# Patient Record
Sex: MALE | Race: WHITE | URBAN - METROPOLITAN AREA
[De-identification: names, ages, dates, MRNs, and addresses within clinical notes are randomized per-mention and may not be internally consistent; named-entity substitution may affect disease eponyms.]

---

## 2021-02-22 ENCOUNTER — OFFICE VISIT (OUTPATIENT)
Dept: INTERNAL MEDICINE CLINIC | Facility: CLINIC | Age: 61
End: 2021-02-22
Payer: COMMERCIAL

## 2021-02-22 VITALS
HEIGHT: 70 IN | WEIGHT: 216 LBS | OXYGEN SATURATION: 97 % | TEMPERATURE: 98 F | DIASTOLIC BLOOD PRESSURE: 85 MMHG | HEART RATE: 59 BPM | SYSTOLIC BLOOD PRESSURE: 150 MMHG | BODY MASS INDEX: 30.92 KG/M2

## 2021-02-22 DIAGNOSIS — Z01.818 PREOPERATIVE EVALUATION TO RULE OUT SURGICAL CONTRAINDICATION: Primary | ICD-10-CM

## 2021-02-22 DIAGNOSIS — Z12.11 SCREENING FOR MALIGNANT NEOPLASM OF COLON: ICD-10-CM

## 2021-02-22 DIAGNOSIS — H26.9 CATARACT, UNSPECIFIED CATARACT TYPE, UNSPECIFIED LATERALITY: ICD-10-CM

## 2021-02-22 DIAGNOSIS — Z11.59 NEED FOR HEPATITIS C SCREENING TEST: ICD-10-CM

## 2021-02-22 PROCEDURE — 3008F BODY MASS INDEX DOCD: CPT | Performed by: INTERNAL MEDICINE

## 2021-02-22 PROCEDURE — 3725F SCREEN DEPRESSION PERFORMED: CPT | Performed by: INTERNAL MEDICINE

## 2021-02-22 PROCEDURE — 99242 OFF/OP CONSLTJ NEW/EST SF 20: CPT | Performed by: INTERNAL MEDICINE

## 2021-02-22 RX ORDER — BROMFENAC SODIUM 0.7 MG/ML
SOLUTION/ DROPS OPHTHALMIC
COMMUNITY
Start: 2021-02-22

## 2021-02-22 RX ORDER — BESIFLOXACIN 6 MG/ML
SUSPENSION OPHTHALMIC
COMMUNITY
Start: 2021-02-22

## 2021-02-22 NOTE — PROGRESS NOTES
Assessment/Plan:    No problem-specific Assessment & Plan notes found for this encounter  Diagnoses and all orders for this visit:    Preoperative evaluation to rule out surgical contraindication    Other orders  -     Besivance 0 6 % SUSP  -     Prolensa 0 07 % SOLN          Subjective:      Patient ID: Mare Lennox is a 61 y o  male  Patient  Here  To  Be  Evaluated  For   preop  Clearance  For  Cataract  Surgery  Patient  Is  A  Low risk  Patient   Based  On  The  Revised  Cardiac risk  Assessment , to  Undergo  A  Low  Risk  Surgery  OK  To  Proceed  With  Surgery  The following portions of the patient's history were reviewed and updated as appropriate: allergies, current medications, past family history, past medical history, past social history, past surgical history, and problem list     Review of Systems   Constitutional: Negative  HENT: Negative for dental problem, drooling, ear discharge and ear pain  Eyes: Negative for discharge, redness and itching  Respiratory: Negative for apnea, cough and wheezing  Cardiovascular: Negative for chest pain and palpitations  Gastrointestinal: Negative for abdominal pain, blood in stool, diarrhea and vomiting  Endocrine: Negative for polydipsia, polyphagia and polyuria  Genitourinary: Negative for decreased urine volume, dysuria and frequency  Musculoskeletal: Negative for arthralgias, myalgias and neck stiffness  Skin: Negative for pallor and wound  Allergic/Immunologic: Negative for environmental allergies and food allergies  Neurological: Negative for facial asymmetry, light-headedness, numbness and headaches  Hematological: Negative for adenopathy  Does not bruise/bleed easily  Psychiatric/Behavioral: Negative for agitation, behavioral problems and confusion  Objective: There were no vitals taken for this visit           Physical Exam      /88 P 70    HEENT  Bilateral  Cataract    NECK  No  JVD  No Bruits    LUNGS  Clear  HEART   Regular  Heart  Rate  And  Rhythm    ABDOMEN  No  Organomegaly  Or  Masses    EXTREMITIES  NO   Edema    Patient  Has  Been  Cleared  For  Cataract  Surgery   Very  Low  Risk  Patient   Based  On the  Revised  Cardiac  Risk  Assessment   Score  Natasha Console   IOANA Story MD

## 2021-04-25 ENCOUNTER — EMERGENCY (EMERGENCY)
Facility: HOSPITAL | Age: 61
LOS: 1 days | Discharge: HOME | End: 2021-04-25
Admitting: EMERGENCY MEDICINE
Payer: COMMERCIAL

## 2021-04-25 VITALS
WEIGHT: 214.95 LBS | HEART RATE: 60 BPM | SYSTOLIC BLOOD PRESSURE: 162 MMHG | DIASTOLIC BLOOD PRESSURE: 85 MMHG | TEMPERATURE: 98 F | RESPIRATION RATE: 16 BRPM | HEIGHT: 70 IN | OXYGEN SATURATION: 100 %

## 2021-04-25 DIAGNOSIS — H57.89 OTHER SPECIFIED DISORDERS OF EYE AND ADNEXA: ICD-10-CM

## 2021-04-25 DIAGNOSIS — Z98.49 CATARACT EXTRACTION STATUS, UNSPECIFIED EYE: Chronic | ICD-10-CM

## 2021-04-25 DIAGNOSIS — H04.121 DRY EYE SYNDROME OF RIGHT LACRIMAL GLAND: ICD-10-CM

## 2021-04-25 PROCEDURE — 99282 EMERGENCY DEPT VISIT SF MDM: CPT

## 2021-04-25 PROCEDURE — 99283 EMERGENCY DEPT VISIT LOW MDM: CPT

## 2021-04-25 NOTE — ED PROVIDER NOTE - NSFOLLOWUPINSTRUCTIONS_ED_ALL_ED_FT
please use artificial tears - systane preservative free (1-2 drops once an hour) over next 24 hrs  you must see your eye doctor tomorrow for full dilated exam  return immediately for any worsening or concerning symptoms

## 2021-04-25 NOTE — ED PROVIDER NOTE - PATIENT PORTAL LINK FT
You can access the FollowMyHealth Patient Portal offered by [facility] by registering at the following website: [email or web address]. By joining [facility]’s FollowMyHealth portal, you will also be able to view your health information using other applications (apps) compatible with our system.

## 2021-04-25 NOTE — ED PROVIDER NOTE - CLINICAL SUMMARY MEDICAL DECISION MAKING FREE TEXT BOX
pt had cataract surg a mon ago, states over past wk the r eye has been irritated, has not f/u w/his eye doctor, no acute or worsening symptoms today, on exam no injection no dc. well appearing, + dry conjunctiva b/l. no concern for acute glaucoma, no abrasion or ulcer. advised to use artificial tears and f/u w/his eye doctor alistair. pt understands and agrees w/plan, strict return precautions given

## 2021-04-25 NOTE — ED ADULT NURSE NOTE - OBJECTIVE STATEMENT
60Y Male A&OX3 C/O right eye erythema for a week. Pt reports having bilateral cataract surgery 2 months ago. Clear drainage noted to right eye. pr pt "It does not feel painful. More like a discomfort that moved down towards my nose. I have not had changes in vision but sometimes see a flash like a disco". Denies chest pain, fever, chills, nor sob right eye lid swelling noted. Pt unsure of daily eye drop medication name. 60Y Male A&OX3 C/O right eye erythema for a week. Pt reports having bilateral cataract surgery 2 months ago. Clear drainage noted to right eye. pr pt "It does not feel painful. More like a discomfort that moved down towards my nose. I have not had changes in vision but sometimes see a flash like a disco". Denies chest pain, fever, chills, nor sob. Right eye lid swelling noted. Pt unsure of daily eye drop medication name.

## 2021-04-25 NOTE — ED ADULT NURSE NOTE - NSIMPLEMENTINTERV_GEN_ALL_ED
Implemented All Universal Safety Interventions:  [place] to call system. Call bell, personal items and telephone within reach. Instruct patient to call for assistance. Room bathroom lighting operational. Non-slip footwear when patient is off stretcher. Physically safe environment: no spills, clutter or unnecessary equipment. Stretcher in lowest position, wheels locked, appropriate side rails in place.

## 2021-04-25 NOTE — ED ADULT NURSE NOTE - CHPI ED NUR SYMPTOMS NEG
no blurred vision/no double vision/no foreign body/no itching/no pain/no photophobia/no purulent drainage

## 2021-04-25 NOTE — ED ADULT TRIAGE NOTE - CHIEF COMPLAINT QUOTE
Pt had cataract surgery to right eye on 3/23, reports redness, tearing to right eye and headache, denies changes in vision.

## 2021-04-25 NOTE — ED ADULT NURSE NOTE - NSFALLRSKOUTCOME_ED_ALL_ED
Client participated in a one-hour recreation group in which all clients played a game of Sorry, then played ping-pong afterwards.   Universal Safety Interventions

## 2021-04-25 NOTE — ED PROVIDER NOTE - OBJECTIVE STATEMENT
The pt is a The pt is a 59 y/o M, who presents to ED c/o R eye irritation x 1 wk. Pt had cataract surg a month ago (done in [place]), states that last wk the eye was a little red, today it felt swollen, he spoke to his eye doctor and told to come in for eval, but since was in [place] today - decided to go to ED instead. Denies injury, trauma, discharge, pain, visual changes, h/a, dizziness, n/v.

## 2021-04-25 NOTE — ED PROVIDER NOTE - EYES, MLM
Clear bilaterally, pupils equal, round and reactive to light. + dry conjunctiva b/l. OD anesthetized w/2 gtts tetracaine, stained w/fluoro strip - no fb, no abrasion, no ulcer

## 2023-01-02 ENCOUNTER — APPOINTMENT (EMERGENCY)
Dept: RADIOLOGY | Facility: HOSPITAL | Age: 63
End: 2023-01-02

## 2023-01-02 ENCOUNTER — HOSPITAL ENCOUNTER (EMERGENCY)
Facility: HOSPITAL | Age: 63
Discharge: HOME | End: 2023-01-02
Attending: EMERGENCY MEDICINE

## 2023-01-02 VITALS
HEART RATE: 105 BPM | DIASTOLIC BLOOD PRESSURE: 87 MMHG | SYSTOLIC BLOOD PRESSURE: 140 MMHG | RESPIRATION RATE: 18 BRPM | OXYGEN SATURATION: 97 % | TEMPERATURE: 98.6 F

## 2023-01-02 DIAGNOSIS — H93.8X3 EAR SWELLING, BILATERAL: ICD-10-CM

## 2023-01-02 DIAGNOSIS — R51.9 HEADACHE: Primary | ICD-10-CM

## 2023-01-02 RX ORDER — METOCLOPRAMIDE 10 MG/1
10 TABLET ORAL ONCE
Status: COMPLETED | OUTPATIENT
Start: 2023-01-02 | End: 2023-01-02

## 2023-01-02 RX ORDER — KETOROLAC TROMETHAMINE 30 MG/ML
15 INJECTION, SOLUTION INTRAMUSCULAR; INTRAVENOUS ONCE
Status: COMPLETED | OUTPATIENT
Start: 2023-01-02 | End: 2023-01-02

## 2023-01-02 RX ORDER — ACETAMINOPHEN 325 MG/1
975 TABLET ORAL ONCE
Status: COMPLETED | OUTPATIENT
Start: 2023-01-02 | End: 2023-01-02

## 2023-01-02 RX ADMIN — KETOROLAC TROMETHAMINE 15 MG: 30 INJECTION, SOLUTION INTRAMUSCULAR; INTRAVENOUS at 02:42

## 2023-01-02 RX ADMIN — DEXAMETHASONE 10 MG: 2 TABLET ORAL at 04:42

## 2023-01-02 RX ADMIN — ACETAMINOPHEN 975 MG: 325 TABLET, FILM COATED ORAL at 02:42

## 2023-01-02 RX ADMIN — METOCLOPRAMIDE 10 MG: 10 TABLET ORAL at 02:42

## 2023-01-02 NOTE — DISCHARGE INSTRUCTIONS
You were seen for a headache and facial pain  We found no emergent causes for your pain  You were offered a lumbar puncture to rule out any bleeding inside your skull, but you declined  Please follow up with your primary care doctor for management of your symptoms

## 2023-01-02 NOTE — ED PROVIDER NOTES
History  Chief Complaint   Patient presents with   • Headache     Headache progressively worse since Thursday  Unable to fall asleep so he came in  States all around head and face is painful to touch  When he bends over it feels like his "head is going to explode " Took motrin Sat which didn't help  Negative at home COVID test  No hx migraines  77-year-old man with no relevant past med history presents with headache  He reports a progressively worsening headache since Thursday  He denies any sudden onset worst headache of his life  Headache continues to worsen and at this point he is having difficulty sleeping  The headache is diffuse and all around his head  He is reporting bilateral ear swelling but no tenderness  No fever, chills, vision changes, hearing changes, muscle weakness, sensory deficits, or difficulty speaking  He tried Advil with no improvement of the symptoms  He took nothing today for his headache  He denies history of headaches  Prior to Admission Medications   Prescriptions Last Dose Informant Patient Reported? Taking? Besivance 0 6 % SUSP   Yes No   Prolensa 0 07 % SOLN   Yes No      Facility-Administered Medications: None       Past Medical History:   Diagnosis Date   • Cervical disc disorder with radiculopathy, unspecified cervical region    • Dyslipidemia    • Vitamin D deficiency        Past Surgical History:   Procedure Laterality Date   • NO PAST SURGERIES         Family History   Problem Relation Age of Onset   • Coronary artery disease Mother         CABG   • Leukemia Father      I have reviewed and agree with the history as documented      E-Cigarette/Vaping     E-Cigarette/Vaping Substances     Social History     Tobacco Use   • Smoking status: Every Day     Packs/day: 1 00     Years: 45 00     Pack years: 45 00     Types: Cigarettes   • Smokeless tobacco: Never   • Tobacco comments:     smokes 11-20 cigarettes/day - As per [facility]    Substance Use Topics   • Alcohol use: Yes     Comment: Socially         Review of Systems   Constitutional: Negative for chills and fever  HENT: Negative for ear pain and sore throat  Eyes: Negative for pain and visual disturbance  Respiratory: Negative for cough and shortness of breath  Cardiovascular: Negative for chest pain and palpitations  Gastrointestinal: Negative for abdominal pain, constipation, diarrhea and vomiting  Genitourinary: Negative for dysuria and hematuria  Musculoskeletal: Negative for arthralgias and back pain  Skin: Negative for color change and rash  Neurological: Positive for headaches  Negative for seizures, syncope and light-headedness  Psychiatric/Behavioral: Negative for agitation and confusion  Physical Exam  ED Triage Vitals   Temperature Pulse Respirations Blood Pressure SpO2   01/02/23 0227 01/02/23 0222 01/02/23 0222 01/02/23 0222 01/02/23 0222   98 6 °F (37 °C) 105 18 140/87 97 %      Temp Source Heart Rate Source Patient Position - Orthostatic VS BP Location FiO2 (%)   01/02/23 0227 01/02/23 0222 01/02/23 0222 01/02/23 0222 --   Oral Monitor Sitting Right arm       Pain Score       01/02/23 0222       8             Orthostatic Vital Signs  Vitals:    01/02/23 0222   BP: 140/87   Pulse: 105   Patient Position - Orthostatic VS: Sitting       Physical Exam  Vitals and nursing note reviewed  Constitutional:       General: He is not in acute distress  Appearance: Normal appearance  He is not ill-appearing  HENT:      Head: Normocephalic and atraumatic  Right Ear: Tympanic membrane and ear canal normal       Left Ear: Tympanic membrane and ear canal normal       Ears:      Comments: Diffuse swelling of bilateral ears  Eyes:      Extraocular Movements: Extraocular movements intact  Pupils: Pupils are equal, round, and reactive to light  Comments: No photophobia  Pulmonary:      Effort: Pulmonary effort is normal  No respiratory distress     Musculoskeletal: General: Normal range of motion  Cervical back: Normal range of motion and neck supple  Skin:     General: Skin is warm and dry  Neurological:      General: No focal deficit present  Mental Status: He is alert and oriented to person, place, and time  Psychiatric:         Mood and Affect: Mood normal          Behavior: Behavior normal          ED Medications  Medications   ketorolac (TORADOL) injection 15 mg (15 mg Intramuscular Given 1/2/23 0242)   acetaminophen (TYLENOL) tablet 975 mg (975 mg Oral Given 1/2/23 0242)   metoclopramide (REGLAN) tablet 10 mg (10 mg Oral Given 1/2/23 0242)   dexamethasone (DECADRON) tablet 10 mg (10 mg Oral Given 1/2/23 0442)       Diagnostic Studies  Results Reviewed     None                 CT head without contrast   ED Interpretation by Makenna Toney MD (01/02 0401)   On my interpretation:  -No obvious intracranial hemorrhage, no obvious ear abnormalities    Awaiting formal read from the radiologist      Final Result by Dudley Simons DO (01/02 0359)      No acute intracranial abnormality  Workstation performed: [id number]               Procedures  Procedures      ED Course         Medical Decision Making  Presents with three days of progressively worsening headache and now with bilateral ear swelling  Patient denies any thunderclap headache but does endorse he does not have headaches and this is new for him  He is now reporting some neck pain with the headache  Will give headache cocktail  CT head ordered to rule out any masses or bleeding, and it was negative  I had a lengthy discussion with the patient about possibly performing an LP to rule out SAH  I told the patient there is a very low chance of him having intracranial bleeding as he lacks some of the more concerning symptoms, but I cannot say there is no intracranial bleeding  I am concerned as he endorsed this headache was very distressing for him, and he does not have headaches  After thorough discussion of risks and benefits, he declined the LP  He reports his headache was improved with the headache cocktail  Upon further discussion, he reports his headache is more of palpable head pain rather than pulsatile headache  Unsure the cause of his head pain and ear swelling but possibly a viral syndrome or allergic reaction  Patient in agreement with plan and questions were answered  Verbalized understanding of return precautions  Portions or all of this note were generated using voice recognition software  Occasional wrong word or "sound a like" substitutions may have occurred due to the inherent limitations of voice recognition software  Please interpret any errors within the intended context of the whole sentence or idea  Ear swelling, bilateral: acute illness or injury  Headache: acute illness or injury        Disposition  Final diagnoses:   Headache   Ear swelling, bilateral     Time reflects when diagnosis was documented in both MDM as applicable and the Disposition within this note     Time User Action Codes Description Comment    1/2/2023  5:10 AM Delle Dionte Add [R51 9] Headache     1/2/2023  5:10 AM Delle Dionte Add [D87 5G1] Ear swelling, bilateral       ED Disposition     ED Disposition   Discharge    Condition   Stable    Date/Time   Mon Jan 2, 2023  5:10 AM    Comment   Bienvenido James discharge to home/self care  Follow-up Information     Follow up With Specialties Details Why Contact Tri Post MD Internal Medicine Go to   [address] [place] [address]  [phone]            Discharge Medication List as of 1/2/2023  5:11 AM      CONTINUE these medications which have NOT CHANGED    Details   Besivance 0 6 % SUSP Starting Mon 2/22/2021, Historical Med      Prolensa 0 07 % SOLN Starting Mon 2/22/2021, Historical Med           No discharge procedures on file      PDMP Review     None           ED Provider  Attending physically available and evaluated Arya Paulino I managed the patient along with the ED Attending      Electronically Signed by         Carolynn Camarena MD  01/02/23 4758

## 2023-01-02 NOTE — ED ATTENDING ATTESTATION
Final Diagnosis:  1  Headache    2  Ear swelling, bilateral      ED Course as of 01/02/23 0523   Mon Jan 02, 2023   0251 HA since Thursday  Getting worse  0522 Based off of the reassuring CT scan and the possibility that subarachnoid hemorrhage could be a possibility for the headache (although my suspicion is extremely low), offered to do lumbar puncture  Patient does not want to done stating that he will follow-up with his primary care doctor  His headache is significantly improved and essentially nonexistent now I keep saying is a pain on the outside of his head  We will do return to ER precautions with supportive care     I, Summer Joaquin MD, saw and evaluated the patient  All available labs and X-rays were ordered by me or the resident and have been reviewed by myself  I discussed the patient with the resident / non-physician and agree with the resident's / non-physician practitioner's findings and plan as documented in the resident's / non-physician practicitioner's note, except where noted  At this point, I agree with the current assessment done in the ED  I was present during key portions of all procedures performed unless otherwise stated  Chief Complaint   Patient presents with   • Headache     Headache progressively worse since Thursday  Unable to fall asleep so he came in  States all around head and face is painful to touch  When he bends over it feels like his "head is going to explode " Took motrin Sat which didn't help  Negative at home COVID test  No hx migraines  This is a 58 y o  male presenting for evaluation of progressively worsening headache  Since Thursday or so he has been having this headache that is worse when he is bending his head forward, better if he is laying back, gradually getting worse much worse today than it was on Thursday  It was not sudden in onset, just getting worse  No fevers chills nausea vomiting chest pain shortness of breath    He thought could be related to COVID so took a COVID test at home which was negative  He tried Motrin times once on Saturday without any relief of his symptoms  It feels like his head is going to explode at times  No focal deficits including numbness or tingling on the arms or legs  Able to walk and do his normal activities  No direct trauma to the head  No visual disturbances including loss of vision double vision blurry vision  No trouble speaking or expressing himself  No hearing changes  He has noticed his bilateral ears are much more swollen and tender for him  On exam extraocular movements are intact, tympanic membranes bilaterally normal not erythematous  Otic canals are normal   The ear itself though bilaterally are red, swollen, almost looks like cauliflower ear but he had no reported trauma to it  There is no mastoid tenderness  There is no C-spine tenderness  There is no submandibular lymphadenopathy  Normal voice, no dysphagia or dysarthria  Pupillary response normal, 4 mm and reactive  No epistaxis noted  No forehead tenderness  He points to the left side of his head where it hurts from the frontal region to the temporal region   5-5, able to sit up from a laying position without any assistance  No belly tenderness  No chest wall tenderness  No tachycardia  Afebrile  Normotensive  Assessment plan: I think this is likely primary headache syndrome however the patient says that he is never really had headaches in the past so discussed doing a CT head  Is been there for several days however my suspicion that this represents a subarachnoid hemorrhage is very very small  I think the harms of a lumbar puncture far outweigh the benefits  With the bilateral ear swelling, I want a make sure there is no obvious abscess or complications there so I think CT head should be of the pickup at least a small portion of it  We will try symptomatic measures    If he feels better will discharge home else we will talk about further work-up specifically CBC BMP for leukocytosis or marked kidney abnormalities  Patient is okay with this plan has no questions time  PMH:   has a past medical history of Cervical disc disorder with radiculopathy, unspecified cervical region, Dyslipidemia, and Vitamin D deficiency  PSH:   has a past surgical history that includes No past surgeries  Social:  Social History     Substance and Sexual Activity   Alcohol Use Yes    Comment: Socially      Social History     Tobacco Use   Smoking Status Every Day   • Packs/day: 1 00   • Years: 45 00   • Pack years: 45 00   • Types: Cigarettes   Smokeless Tobacco Never   Tobacco Comments    smokes 11-20 cigarettes/day - As per [employer]      Social History     Substance and Sexual Activity   Drug Use Not on file     PE:  Vitals:    01/02/23 0222 01/02/23 0227   BP: 140/87    BP Location: Right arm    Pulse: 105    Resp: 18    Temp:  98 6 °F (37 °C)   TempSrc:  Oral   SpO2: 97%      - 13 point ROS was performed and all are normal unless stated in the history above  - Nursing note reviewed  Vitals reviewed  - Orders placed by myself and/or advanced practitioner / resident     - Previous chart was reviewed  - No language barrier    - History obtained from patient  - There are no limitations to the history obtained  - Critical care time: Not applicable for this patient       Code Status: No Order  Advance Directive and Living Will:      Power of :    POLST:      Medications   ketorolac (TORADOL) injection 15 mg (15 mg Intramuscular Given 1/2/23 0242)   acetaminophen (TYLENOL) tablet 975 mg (975 mg Oral Given 1/2/23 0242)   metoclopramide (REGLAN) tablet 10 mg (10 mg Oral Given 1/2/23 0242)   dexamethasone (DECADRON) tablet 10 mg (10 mg Oral Given 1/2/23 0442)     CT head without contrast   ED Interpretation   On my interpretation:  -No obvious intracranial hemorrhage, no obvious ear abnormalities    Awaiting formal read from the radiologist      Final Result      No acute intracranial abnormality  Workstation performed: [id number]           Orders Placed This Encounter   Procedures   • CT head without contrast     Labs Reviewed - No data to display  Time reflects when diagnosis was documented in both MDM as applicable and the Disposition within this note     Time User Action Codes Description Comment    1/2/2023  5:10 AM Leanderkody Atwoodo Add [R51 9] Headache     1/2/2023  5:10 AM Leander Atwoodo Add [B94 8A3] Ear swelling, bilateral       ED Disposition     ED Disposition   Discharge    Condition   Stable    Date/Time   Mon Jan 2, 2023  5:10 AM    Comment   Arya Jefferson discharge to home/self care  Follow-up Information     Follow up With Specialties Details Why Contact Efren Sanchez MD Internal Medicine Go to   [address] [place] [address]  [phone]          Patient's Medications   Discharge Prescriptions    No medications on file     No discharge procedures on file  Prior to Admission Medications   Prescriptions Last Dose Informant Patient Reported? Taking? Besivance 0 6 % SUSP   Yes No   Prolensa 0 07 % SOLN   Yes No      Facility-Administered Medications: None       Portions of the record may have been created with voice recognition software  Occasional wrong word or "sound a like" substitutions may have occurred due to the inherent limitations of voice recognition software  Read the chart carefully and recognize, using context, where substitutions have occurred      Electronically signed by:  Nora Rajan

## 2023-01-03 ENCOUNTER — OFFICE VISIT (OUTPATIENT)
Dept: INTERNAL MEDICINE CLINIC | Facility: CLINIC | Age: 63
End: 2023-01-03

## 2023-01-03 VITALS
WEIGHT: 207 LBS | HEART RATE: 70 BPM | TEMPERATURE: 98.2 F | HEIGHT: 70 IN | SYSTOLIC BLOOD PRESSURE: 132 MMHG | OXYGEN SATURATION: 97 % | BODY MASS INDEX: 29.63 KG/M2 | DIASTOLIC BLOOD PRESSURE: 70 MMHG

## 2023-01-03 DIAGNOSIS — N52.8 OTHER MALE ERECTILE DYSFUNCTION: ICD-10-CM

## 2023-01-03 DIAGNOSIS — Z00.00 ANNUAL PHYSICAL EXAM: Primary | ICD-10-CM

## 2023-01-03 DIAGNOSIS — R73.01 IMPAIRED FASTING BLOOD SUGAR: ICD-10-CM

## 2023-01-03 DIAGNOSIS — E78.2 MIXED HYPERLIPIDEMIA: ICD-10-CM

## 2023-01-03 DIAGNOSIS — R35.0 URINARY FREQUENCY: ICD-10-CM

## 2023-01-03 DIAGNOSIS — R53.83 OTHER FATIGUE: ICD-10-CM

## 2023-01-03 NOTE — PATIENT INSTRUCTIONS

## 2023-01-31 LAB
ALBUMIN SERPL-MCNC: 4.4 G/DL (ref 3.6–5.1)
ALBUMIN/GLOB SERPL: 2.2 (CALC) (ref 1–2.5)
ALP SERPL-CCNC: 55 U/L (ref 35–144)
ALT SERPL-CCNC: 28 U/L (ref 9–46)
AST SERPL-CCNC: 15 U/L (ref 10–35)
BASOPHILS # BLD AUTO: 57 CELLS/UL (ref 0–200)
BASOPHILS NFR BLD AUTO: 0.7 %
BILIRUB SERPL-MCNC: 0.6 MG/DL (ref 0.2–1.2)
BUN SERPL-MCNC: 13 MG/DL (ref 7–25)
BUN/CREAT SERPL: ABNORMAL (CALC) (ref 6–22)
CALCIUM SERPL-MCNC: 9.4 MG/DL (ref 8.6–10.3)
CHLORIDE SERPL-SCNC: 103 MMOL/L (ref 98–110)
CHOLEST SERPL-MCNC: 205 MG/DL
CHOLEST/HDLC SERPL: 3.7 (CALC)
CO2 SERPL-SCNC: 31 MMOL/L (ref 20–32)
CREAT SERPL-MCNC: 0.97 MG/DL (ref 0.7–1.35)
EOSINOPHIL # BLD AUTO: 221 CELLS/UL (ref 15–500)
EOSINOPHIL NFR BLD AUTO: 2.7 %
ERYTHROCYTE [DISTWIDTH] IN BLOOD BY AUTOMATED COUNT: 12.7 % (ref 11–15)
GFR/BSA.PRED SERPLBLD CYS-BASED-ARV: 88 ML/MIN/1.73M2
GLOBULIN SER CALC-MCNC: 2 G/DL (CALC) (ref 1.9–3.7)
GLUCOSE SERPL-MCNC: 115 MG/DL (ref 65–99)
HBA1C MFR BLD: 5.4 % OF TOTAL HGB
HCT VFR BLD AUTO: 43 % (ref 38.5–50)
HDLC SERPL-MCNC: 55 MG/DL
HGB BLD-MCNC: 14.6 G/DL (ref 13.2–17.1)
LDLC SERPL CALC-MCNC: 127 MG/DL (CALC)
LYMPHOCYTES # BLD AUTO: 2854 CELLS/UL (ref 850–3900)
LYMPHOCYTES NFR BLD AUTO: 34.8 %
MCH RBC QN AUTO: 32 PG (ref 27–33)
MCHC RBC AUTO-ENTMCNC: 34 G/DL (ref 32–36)
MCV RBC AUTO: 94.3 FL (ref 80–100)
MONOCYTES # BLD AUTO: 558 CELLS/UL (ref 200–950)
MONOCYTES NFR BLD AUTO: 6.8 %
NEUTROPHILS # BLD AUTO: 4510 CELLS/UL (ref 1500–7800)
NEUTROPHILS NFR BLD AUTO: 55 %
NONHDLC SERPL-MCNC: 150 MG/DL (CALC)
PLATELET # BLD AUTO: 255 THOUSAND/UL (ref 140–400)
PMV BLD REES-ECKER: 10.4 FL (ref 7.5–12.5)
POTASSIUM SERPL-SCNC: 4.6 MMOL/L (ref 3.5–5.3)
PROT SERPL-MCNC: 6.4 G/DL (ref 6.1–8.1)
PSA FREE MFR SERPL: 33 % (CALC)
PSA FREE SERPL-MCNC: 0.5 NG/ML
PSA SERPL-MCNC: 1.5 NG/ML
RBC # BLD AUTO: 4.56 MILLION/UL (ref 4.2–5.8)
SODIUM SERPL-SCNC: 138 MMOL/L (ref 135–146)
TRIGL SERPL-MCNC: 115 MG/DL
TSH SERPL-ACNC: 1.2 MIU/L (ref 0.4–4.5)
WBC # BLD AUTO: 8.2 THOUSAND/UL (ref 3.8–10.8)

## 2023-03-30 ENCOUNTER — TELEPHONE (OUTPATIENT)
Dept: INTERNAL MEDICINE CLINIC | Facility: CLINIC | Age: 63
End: 2023-03-30

## 2023-03-30 DIAGNOSIS — B49 FUNGAL INFECTION: Primary | ICD-10-CM

## 2023-03-30 RX ORDER — CLOTRIMAZOLE AND BETAMETHASONE DIPROPIONATE 10; .64 MG/G; MG/G
CREAM TOPICAL 2 TIMES DAILY
Qty: 30 G | Refills: 0 | Status: SHIPPED | OUTPATIENT
Start: 2023-03-30

## 2023-03-30 NOTE — TELEPHONE ENCOUNTER
Patient has jock itch and has tried everything over the counter and nothing is working  Can you fax something to his pharmacy?

## 2023-06-02 ENCOUNTER — OFFICE VISIT (OUTPATIENT)
Dept: INTERNAL MEDICINE CLINIC | Facility: CLINIC | Age: 63
End: 2023-06-02

## 2023-06-02 VITALS
OXYGEN SATURATION: 97 % | SYSTOLIC BLOOD PRESSURE: 136 MMHG | DIASTOLIC BLOOD PRESSURE: 84 MMHG | TEMPERATURE: 98.4 F | WEIGHT: 201 LBS | HEART RATE: 74 BPM | BODY MASS INDEX: 28.77 KG/M2 | HEIGHT: 70 IN

## 2023-06-02 DIAGNOSIS — B49 FUNGAL INFECTION: ICD-10-CM

## 2023-06-02 DIAGNOSIS — L42 PITYRIASIS ROSEA: Primary | ICD-10-CM

## 2023-06-02 PROBLEM — L71.8 PHYMATOUS ROSACEA: Status: RESOLVED | Noted: 2023-06-02 | Resolved: 2023-06-02

## 2023-06-02 PROBLEM — L71.8 PHYMATOUS ROSACEA: Status: ACTIVE | Noted: 2023-06-02

## 2023-06-02 RX ORDER — FLUCONAZOLE 100 MG/1
100 TABLET ORAL EVERY OTHER DAY
Qty: 5 TABLET | Refills: 0 | Status: SHIPPED | OUTPATIENT
Start: 2023-06-02 | End: 2023-06-11

## 2023-06-02 NOTE — PROGRESS NOTES
Assessment/Plan:             1  Pityriasis rosea    2  Fungal infection           Subjective:      Patient ID: Collette Men is a 58 y o  male  Rash on the belly both side, also on the leg, minimally itchy,      The following portions of the patient's history were reviewed and updated as appropriate: He  has a past medical history of Cervical disc disorder with radiculopathy, unspecified cervical region, Dyslipidemia, and Vitamin D deficiency  He   Patient Active Problem List    Diagnosis Date Noted   • Annual physical exam 01/03/2023   • Other male erectile dysfunction 01/03/2023   • Urinary frequency 01/03/2023   • Mixed hyperlipidemia 01/03/2023   • Impaired fasting blood sugar 01/03/2023     He  has a past surgical history that includes No past surgeries  His family history includes Coronary artery disease in his mother; Leukemia in his father  He  reports that he has been smoking cigarettes  He has a 45 00 pack-year smoking history  He has never used smokeless tobacco  He reports current alcohol use  No history on file for drug use  Current Outpatient Medications   Medication Sig Dispense Refill   • clotrimazole-betamethasone (LOTRISONE) 1-0 05 % cream Apply topically 2 (two) times a day (Patient not taking: Reported on 6/2/2023) 30 g 0     No current facility-administered medications for this visit  Current Outpatient Medications on File Prior to Visit   Medication Sig   • clotrimazole-betamethasone (LOTRISONE) 1-0 05 % cream Apply topically 2 (two) times a day (Patient not taking: Reported on 6/2/2023)     No current facility-administered medications on file prior to visit  He has No Known Allergies       Review of Systems   Constitutional: Negative for chills and fever  HENT: Negative for congestion, ear pain and sore throat  Eyes: Negative for pain  Respiratory: Negative for cough and shortness of breath  Cardiovascular: Negative for chest pain and leg swelling     Gastrointestinal: "Negative for abdominal pain, nausea and vomiting  Endocrine: Negative for polyuria  Genitourinary: Negative for difficulty urinating, frequency and urgency  Musculoskeletal: Negative for arthralgias and back pain  Skin: Positive for rash  Neurological: Negative for weakness and headaches  Psychiatric/Behavioral: Negative for sleep disturbance  The patient is not nervous/anxious  Objective:      /84 (BP Location: Left arm, Patient Position: Sitting, Cuff Size: Large)   Pulse 74   Temp 98 4 °F (36 9 °C) (Temporal)   Ht 5' 10\" (1 778 m)   Wt 91 2 kg (201 lb)   SpO2 97%   BMI 28 84 kg/m²     No results found for this or any previous visit (from the past 1344 hour(s))  Physical Exam  Constitutional:       Appearance: Normal appearance  HENT:      Head: Normocephalic  Right Ear: External ear normal       Left Ear: External ear normal       Nose: Nose normal  No congestion  Mouth/Throat:      Mouth: Mucous membranes are moist       Pharynx: Oropharynx is clear  No oropharyngeal exudate or posterior oropharyngeal erythema  Eyes:      Extraocular Movements: Extraocular movements intact  Conjunctiva/sclera: Conjunctivae normal    Cardiovascular:      Rate and Rhythm: Normal rate and regular rhythm  Heart sounds: Normal heart sounds  No murmur heard  Pulmonary:      Effort: Pulmonary effort is normal       Breath sounds: Normal breath sounds  No wheezing or rales  Abdominal:      General: Bowel sounds are normal  There is no distension  Palpations: Abdomen is soft  Tenderness: There is no abdominal tenderness  Musculoskeletal:         General: Normal range of motion  Cervical back: Normal range of motion and neck supple  Right lower leg: No edema  Left lower leg: No edema  Lymphadenopathy:      Cervical: No cervical adenopathy  Skin:     General: Skin is warm        Comments: Macular erythematous rash on both side of the belly on the " front and back minimal scaling,   Neurological:      General: No focal deficit present  Mental Status: He is alert and oriented to person, place, and time

## 2023-06-09 ENCOUNTER — TELEPHONE (OUTPATIENT)
Dept: INTERNAL MEDICINE CLINIC | Facility: CLINIC | Age: 63
End: 2023-06-09

## 2023-06-09 NOTE — TELEPHONE ENCOUNTER
Left him a message that we need to make him an appointment for Monday
Patient called his rash on (mid section and some on his legs ) is not getting any better DR Shanna Douglas gave him 5 pills for 5 days  of fluconazole 100 mg he was wondering if he could get something stronger , his pharmacy is [facility]
Patient works in [place] and will not be able to make an appointment for Monday  Saw Dr Ole Alvarez  6/2/23 for Rosacea and needs a different medication, what Dr Ole Alvarez prescribed is not helping  Dr Ole Alvarez told him to call back if the medication did not help him 
This will have to wait till Dr Margoth Ramesh is back 
no fever and no chills.

## 2023-06-20 DIAGNOSIS — B49 FUNGAL INFECTION: Primary | ICD-10-CM

## 2023-06-20 RX ORDER — FLUCONAZOLE 100 MG/1
100 TABLET ORAL EVERY OTHER DAY
Qty: 7 TABLET | Refills: 0 | Status: SHIPPED | OUTPATIENT
Start: 2023-06-20 | End: 2023-07-03

## 2024-03-07 DIAGNOSIS — B49 FUNGAL INFECTION: ICD-10-CM

## 2024-03-07 DIAGNOSIS — Z00.00 ANNUAL PHYSICAL EXAM: Primary | ICD-10-CM

## 2024-03-07 DIAGNOSIS — R73.01 IMPAIRED FASTING BLOOD SUGAR: ICD-10-CM

## 2024-03-07 DIAGNOSIS — E78.2 MIXED HYPERLIPIDEMIA: ICD-10-CM

## 2024-03-07 RX ORDER — CLOTRIMAZOLE AND BETAMETHASONE DIPROPIONATE 10; .64 MG/G; MG/G
CREAM TOPICAL 2 TIMES DAILY
Qty: 30 G | Refills: 1 | Status: SHIPPED | OUTPATIENT
Start: 2024-03-07

## 2024-03-07 RX ORDER — FLUCONAZOLE 100 MG/1
100 TABLET ORAL DAILY
Qty: 3 TABLET | Refills: 0 | Status: SHIPPED | OUTPATIENT
Start: 2024-03-07 | End: 2024-03-10

## 2024-03-24 LAB
ALBUMIN SERPL-MCNC: 4.6 G/DL (ref 3.6–5.1)
ALBUMIN/GLOB SERPL: 2.2 (CALC) (ref 1–2.5)
ALP SERPL-CCNC: 56 U/L (ref 35–144)
ALT SERPL-CCNC: 22 U/L (ref 9–46)
AST SERPL-CCNC: 15 U/L (ref 10–35)
BASOPHILS # BLD AUTO: 52 CELLS/UL (ref 0–200)
BASOPHILS NFR BLD AUTO: 0.5 %
BILIRUB SERPL-MCNC: 0.5 MG/DL (ref 0.2–1.2)
BUN SERPL-MCNC: 20 MG/DL (ref 7–25)
BUN/CREAT SERPL: ABNORMAL (CALC) (ref 6–22)
CALCIUM SERPL-MCNC: 9.6 MG/DL (ref 8.6–10.3)
CHLORIDE SERPL-SCNC: 105 MMOL/L (ref 98–110)
CHOLEST SERPL-MCNC: 237 MG/DL
CHOLEST/HDLC SERPL: 4.2 (CALC)
CO2 SERPL-SCNC: 28 MMOL/L (ref 20–32)
CREAT SERPL-MCNC: 1.02 MG/DL (ref 0.7–1.35)
EOSINOPHIL # BLD AUTO: 175 CELLS/UL (ref 15–500)
EOSINOPHIL NFR BLD AUTO: 1.7 %
ERYTHROCYTE [DISTWIDTH] IN BLOOD BY AUTOMATED COUNT: 12.5 % (ref 11–15)
GFR/BSA.PRED SERPLBLD CYS-BASED-ARV: 83 ML/MIN/1.73M2
GLOBULIN SER CALC-MCNC: 2.1 G/DL (CALC) (ref 1.9–3.7)
GLUCOSE SERPL-MCNC: 108 MG/DL (ref 65–99)
HBA1C MFR BLD: 5.7 % OF TOTAL HGB
HCT VFR BLD AUTO: 44.3 % (ref 38.5–50)
HDLC SERPL-MCNC: 56 MG/DL
HGB BLD-MCNC: 15.3 G/DL (ref 13.2–17.1)
LDLC SERPL CALC-MCNC: 157 MG/DL (CALC)
LYMPHOCYTES # BLD AUTO: 3327 CELLS/UL (ref 850–3900)
LYMPHOCYTES NFR BLD AUTO: 32.3 %
MCH RBC QN AUTO: 32.3 PG (ref 27–33)
MCHC RBC AUTO-ENTMCNC: 34.5 G/DL (ref 32–36)
MCV RBC AUTO: 93.7 FL (ref 80–100)
MONOCYTES # BLD AUTO: 639 CELLS/UL (ref 200–950)
MONOCYTES NFR BLD AUTO: 6.2 %
NEUTROPHILS # BLD AUTO: 6108 CELLS/UL (ref 1500–7800)
NEUTROPHILS NFR BLD AUTO: 59.3 %
NONHDLC SERPL-MCNC: 181 MG/DL (CALC)
PLATELET # BLD AUTO: 255 THOUSAND/UL (ref 140–400)
PMV BLD REES-ECKER: 10.8 FL (ref 7.5–12.5)
POTASSIUM SERPL-SCNC: 4.4 MMOL/L (ref 3.5–5.3)
PROT SERPL-MCNC: 6.7 G/DL (ref 6.1–8.1)
RBC # BLD AUTO: 4.73 MILLION/UL (ref 4.2–5.8)
SODIUM SERPL-SCNC: 140 MMOL/L (ref 135–146)
TRIGL SERPL-MCNC: 122 MG/DL
TSH SERPL-ACNC: 1.27 MIU/L (ref 0.4–4.5)
WBC # BLD AUTO: 10.3 THOUSAND/UL (ref 3.8–10.8)

## 2024-03-29 ENCOUNTER — OFFICE VISIT (OUTPATIENT)
Dept: INTERNAL MEDICINE CLINIC | Facility: CLINIC | Age: 64
End: 2024-03-29
Payer: COMMERCIAL

## 2024-03-29 DIAGNOSIS — R00.2 PALPITATION: ICD-10-CM

## 2024-03-29 DIAGNOSIS — L40.9 PSORIASIS: ICD-10-CM

## 2024-03-29 DIAGNOSIS — Z12.11 SCREENING FOR COLON CANCER: ICD-10-CM

## 2024-03-29 DIAGNOSIS — Z00.00 ANNUAL PHYSICAL EXAM: ICD-10-CM

## 2024-03-29 DIAGNOSIS — R73.01 IMPAIRED FASTING BLOOD SUGAR: ICD-10-CM

## 2024-03-29 DIAGNOSIS — E78.2 MIXED HYPERLIPIDEMIA: Primary | ICD-10-CM

## 2024-03-29 DIAGNOSIS — F17.210 SMOKING GREATER THAN 30 PACK YEARS: ICD-10-CM

## 2024-03-29 DIAGNOSIS — Z23 NEED FOR PROPHYLACTIC VACCINATION AND INOCULATION AGAINST VARICELLA: ICD-10-CM

## 2024-03-29 PROCEDURE — 93000 ELECTROCARDIOGRAM COMPLETE: CPT | Performed by: INTERNAL MEDICINE

## 2024-03-29 PROCEDURE — 99214 OFFICE O/P EST MOD 30 MIN: CPT | Performed by: INTERNAL MEDICINE

## 2024-03-29 PROCEDURE — 99396 PREV VISIT EST AGE 40-64: CPT | Performed by: INTERNAL MEDICINE

## 2024-03-29 RX ORDER — ZOSTER VACCINE RECOMBINANT, ADJUVANTED 50 MCG/0.5
0.5 KIT INTRAMUSCULAR ONCE
Qty: 1 EACH | Refills: 1 | Status: SHIPPED | OUTPATIENT
Start: 2024-03-29 | End: 2024-03-29

## 2024-03-29 NOTE — PROGRESS NOTES
ADULT ANNUAL PHYSICAL  [facility] INTERNAL MEDICINE    NAME: Issac Oliver  AGE: 63 y.o. SEX: male  : 1960     DATE: 3/29/2024     Assessment and Plan:     Problem List Items Addressed This Visit          Endocrine    Impaired fasting blood sugar       Other    Annual physical exam    Mixed hyperlipidemia - Primary     Other Visit Diagnoses       Screening for colon cancer        Smoking greater than 30 pack years        Psoriasis        Palpitation                Immunizations and preventive care screenings were discussed with patient today. Appropriate education was printed on patient's after visit summary.    Discussed risks and benefits of prostate cancer screening. We discussed the controversial history of PSA screening for prostate cancer in the [place] as well as the risk of over detection and over treatment of prostate cancer by way of PSA screening.  The patient understands that PSA blood testing is an imperfect way to screen for prostate cancer and that elevated PSA levels in the blood may also be caused by infection, inflammation, prostatic trauma or manipulation, urological procedures, or by benign prostatic enlargement.    The role of the digital rectal examination in prostate cancer screening was also discussed and I discussed with him that there is large interobserver variability in the findings of digital rectal examination.    Counseling:  Exercise: the importance of regular exercise/physical activity was discussed. Recommend exercise 3-5 times per week for at least 30 minutes.       Depression Screening and Follow-up Plan: Patient was screened for depression during today's encounter. They screened negative with a PHQ-2 score of 0.    Tobacco Cessation Counseling: Tobacco cessation counseling was provided. The patient is sincerely urged to quit consumption of tobacco. He is ready to quit tobacco.         No follow-ups on file.     Chief  Complaint:     Chief Complaint   Patient presents with    Follow-up     Go over labs      Physical Exam      History of Present Illness:     Adult Annual Physical   Patient here for a comprehensive physical exam. The patient reports problems - palpitation .    Diet and Physical Activity  Diet/Nutrition: well balanced diet.   Exercise: moderate cardiovascular exercise.      Depression Screening  PHQ-2/9 Depression Screening    Little interest or pleasure in doing things: 0 - not at all  Feeling down, depressed, or hopeless: 0 - not at all  PHQ-2 Score: 0  PHQ-2 Interpretation: Negative depression screen       General Health  Sleep: sleeps well.   Hearing: normal - bilateral.  Vision: no vision problems.   Dental: no dental visits for >1 year.        Health  Symptoms include: none    Advanced Care Planning  Do you have an advanced directive? no  Do you have a durable medical power of ? yes  ACP document given to patient? yes     Review of Systems:     Review of Systems   Constitutional:  Negative for chills and fever.   HENT:  Negative for congestion, ear pain and sore throat.    Eyes:  Negative for pain.   Respiratory:  Negative for cough and shortness of breath.    Cardiovascular:  Positive for palpitations. Negative for chest pain and leg swelling.   Gastrointestinal:  Negative for abdominal pain, nausea and vomiting.   Endocrine: Negative for polyuria.   Genitourinary:  Negative for difficulty urinating, frequency and urgency.   Musculoskeletal:  Negative for arthralgias and back pain.   Skin:  Negative for rash.   Neurological:  Negative for weakness and headaches.   Psychiatric/Behavioral:  Negative for sleep disturbance. The patient is not nervous/anxious.       Past Medical History:     Past Medical History:   Diagnosis Date    Cervical disc disorder with radiculopathy, unspecified cervical region     Dyslipidemia     Vitamin D deficiency       Past Surgical History:     Past Surgical History:    Procedure Laterality Date    NO PAST SURGERIES        Family History:     Family History   Problem Relation Age of Onset    Coronary artery disease Mother         CABG    Leukemia Father       Social History:     Social History     Socioeconomic History    Marital status: Single     Spouse name: None    Number of children: None    Years of education: None    Highest education level: None   Occupational History    None   Tobacco Use    Smoking status: Every Day     Current packs/day: 1.00     Average packs/day: 1 pack/day for 45.0 years (45.0 ttl pk-yrs)     Types: Cigarettes     Passive exposure: Never    Smokeless tobacco: Never    Tobacco comments:     smokes 11-20 cigarettes/day - As per [employer]    Vaping Use    Vaping status: Never Used   Substance and Sexual Activity    Alcohol use: Yes     Comment: Socially     Drug use: None    Sexual activity: None   Other Topics Concern    None   Social History Narrative    Marital status:  - As per [employer]      Social Determinants of Health     Financial Resource Strain: Not on file   Food Insecurity: Not on file   Transportation Needs: Not on file   Physical Activity: Not on file   Stress: Not on file   Social Connections: Not on file   Intimate Partner Violence: Not on file   Housing Stability: Not on file      Current Medications:     Current Outpatient Medications   Medication Sig Dispense Refill    clotrimazole-betamethasone (LOTRISONE) 1-0.05 % cream Apply topically 2 (two) times a day 30 g 1     No current facility-administered medications for this visit.      Allergies:     No Known Allergies   Physical Exam:     There were no vitals taken for this visit.    Physical Exam  Vitals and nursing note reviewed.   Constitutional:       General: He is not in acute distress.     Appearance: He is well-developed.   HENT:      Head: Normocephalic and atraumatic.   Eyes:      Conjunctiva/sclera: Conjunctivae normal.   Cardiovascular:      Rate and  Rhythm: Normal rate and regular rhythm.      Heart sounds: No murmur heard.  Pulmonary:      Effort: Pulmonary effort is normal. No respiratory distress.      Breath sounds: Normal breath sounds.   Abdominal:      Palpations: Abdomen is soft.      Tenderness: There is no abdominal tenderness.   Musculoskeletal:         General: No swelling.      Cervical back: Neck supple.   Skin:     General: Skin is warm and dry.      Capillary Refill: Capillary refill takes less than 2 seconds.   Neurological:      Mental Status: He is alert.   Psychiatric:         Mood and Affect: Mood normal.          Emory Wadsworth MD  [facility] INTERNAL MEDICINE

## 2024-03-29 NOTE — PROGRESS NOTES
Assessment/Plan:      Depression Screening and Follow-up Plan: Patient was screened for depression during today's encounter. They screened negative with a PHQ-2 score of 0.    Tobacco Cessation Counseling: Tobacco cessation counseling was provided. The patient is sincerely urged to quit consumption of tobacco. He is ready to quit tobacco.             1. Mixed hyperlipidemia    2. Screening for colon cancer    3. Impaired fasting blood sugar    4. Annual physical exam    5. Smoking greater than 30 pack years    6. Psoriasis    7. Palpitation           Subjective:      Patient ID: Issac Oliver is a 63 y.o. male.    Follow-up on blood test done on 3/23/2024 test discussed with him, also physical, occasionally palpitation , no chest pain        The following portions of the patient's history were reviewed and updated as appropriate: He  has a past medical history of Cervical disc disorder with radiculopathy, unspecified cervical region, Dyslipidemia, and Vitamin D deficiency.  He   Patient Active Problem List    Diagnosis Date Noted    Annual physical exam 01/03/2023    Other male erectile dysfunction 01/03/2023    Urinary frequency 01/03/2023    Mixed hyperlipidemia 01/03/2023    Impaired fasting blood sugar 01/03/2023     He  has a past surgical history that includes No past surgeries.  His family history includes Coronary artery disease in his mother; Leukemia in his father.  He  reports that he has been smoking cigarettes. He has a 45 pack-year smoking history. He has never been exposed to tobacco smoke. He has never used smokeless tobacco. He reports current alcohol use. No history on file for drug use.  Current Outpatient Medications   Medication Sig Dispense Refill    clotrimazole-betamethasone (LOTRISONE) 1-0.05 % cream Apply topically 2 (two) times a day 30 g 1     No current facility-administered medications for this visit.     Current Outpatient Medications on File Prior to Visit   Medication Sig     clotrimazole-betamethasone (LOTRISONE) 1-0.05 % cream Apply topically 2 (two) times a day     No current facility-administered medications on file prior to visit.     He has No Known Allergies..    Review of Systems   Constitutional:  Negative for chills and fever.   HENT:  Negative for congestion, ear pain and sore throat.    Eyes:  Negative for pain.   Respiratory:  Negative for cough and shortness of breath.    Cardiovascular:  Positive for palpitations. Negative for chest pain and leg swelling.   Gastrointestinal:  Negative for abdominal pain, nausea and vomiting.   Endocrine: Negative for polyuria.   Genitourinary:  Negative for difficulty urinating, frequency and urgency.   Musculoskeletal:  Negative for arthralgias and back pain.   Skin:  Negative for rash.   Neurological:  Negative for weakness and headaches.   Psychiatric/Behavioral:  Negative for sleep disturbance. The patient is not nervous/anxious.          Objective:      There were no vitals taken for this visit.    Recent Results (from the past 1344 hour(s))   Lipid Panel with Direct LDL reflex    Collection Time: 03/23/24  9:40 AM   Result Value Ref Range    Total Cholesterol 237 (H) <200 mg/dL    HDL 56 > OR = 40 mg/dL    Triglycerides 122 <150 mg/dL    LDL Calculated 157 (H) mg/dL (calc)    Chol HDLC Ratio 4.2 <5.0 (calc)    Non-HDL Cholesterol 181 (H) <130 mg/dL (calc)   Comprehensive metabolic panel    Collection Time: 03/23/24  9:40 AM   Result Value Ref Range    Glucose, Random 108 (H) 65 - 99 mg/dL    BUN 20 7 - 25 mg/dL    Creatinine 1.02 0.70 - 1.35 mg/dL    eGFR 83 > OR = 60 mL/min/1.73m2    SL AMB BUN/CREATININE RATIO SEE NOTE: 6 - 22 (calc)    Sodium 140 135 - 146 mmol/L    Potassium 4.4 3.5 - 5.3 mmol/L    Chloride 105 98 - 110 mmol/L    CO2 28 20 - 32 mmol/L    Calcium 9.6 8.6 - 10.3 mg/dL    Protein, Total 6.7 6.1 - 8.1 g/dL    Albumin 4.6 3.6 - 5.1 g/dL    Globulin 2.1 1.9 - 3.7 g/dL (calc)    Albumin/Globulin Ratio 2.2 1.0 - 2.5  (calc)    TOTAL BILIRUBIN 0.5 0.2 - 1.2 mg/dL    Alkaline Phosphatase 56 35 - 144 U/L    AST 15 10 - 35 U/L    ALT 22 9 - 46 U/L   CBC and differential    Collection Time: 03/23/24  9:40 AM   Result Value Ref Range    White Blood Cell Count 10.3 3.8 - 10.8 Thousand/uL    Red Blood Cell Count 4.73 4.20 - 5.80 Million/uL    Hemoglobin 15.3 13.2 - 17.1 g/dL    HCT 44.3 38.5 - 50.0 %    MCV 93.7 80.0 - 100.0 fL    MCH 32.3 27.0 - 33.0 pg    MCHC 34.5 32.0 - 36.0 g/dL    RDW 12.5 11.0 - 15.0 %    Platelet Count 255 140 - 400 Thousand/uL    SL AMB MPV 10.8 7.5 - 12.5 fL    Neutrophils (Absolute) 6,108 1,500 - 7,800 cells/uL    Lymphocytes (Absolute) 3,327 850 - 3,900 cells/uL    Monocytes (Absolute) 639 200 - 950 cells/uL    Eosinophils (Absolute) 175 15 - 500 cells/uL    Basophils ABS 52 0 - 200 cells/uL    Neutrophils 59.3 %    Lymphocytes 32.3 %    Monocytes 6.2 %    Eosinophils 1.7 %    Basophils PCT 0.5 %   TSH, 3rd generation    Collection Time: 03/23/24  9:40 AM   Result Value Ref Range    TSH 1.27 0.40 - 4.50 mIU/L   Hemoglobin A1c (w/out EAG)    Collection Time: 03/23/24  9:40 AM   Result Value Ref Range    Hemoglobin A1C 5.7 (H) <5.7 % of total Hgb        Physical Exam  Constitutional:       Appearance: Normal appearance.   HENT:      Head: Normocephalic.      Right Ear: Tympanic membrane, ear canal and external ear normal.      Left Ear: Tympanic membrane, ear canal and external ear normal.      Nose: Nose normal. No congestion.      Mouth/Throat:      Mouth: Mucous membranes are moist.      Pharynx: Oropharynx is clear. No oropharyngeal exudate or posterior oropharyngeal erythema.   Eyes:      Extraocular Movements: Extraocular movements intact.      Conjunctiva/sclera: Conjunctivae normal.   Cardiovascular:      Rate and Rhythm: Normal rate and regular rhythm.      Heart sounds: Normal heart sounds. No murmur heard.  Pulmonary:      Effort: Pulmonary effort is normal.      Breath sounds: Normal breath sounds.  No wheezing or rales.   Abdominal:      General: Abdomen is flat. Bowel sounds are normal. There is no distension.      Palpations: Abdomen is soft.      Tenderness: There is no abdominal tenderness.   Musculoskeletal:         General: Normal range of motion.      Cervical back: Normal range of motion and neck supple.      Right lower leg: No edema.      Left lower leg: No edema.   Lymphadenopathy:      Cervical: No cervical adenopathy.   Skin:     General: Skin is warm.   Neurological:      General: No focal deficit present.      Mental Status: He is alert and oriented to person, place, and time.

## 2024-12-27 ENCOUNTER — TELEPHONE (OUTPATIENT)
Age: 64
End: 2024-12-27

## 2025-01-03 NOTE — TELEPHONE ENCOUNTER
Issac called in because he would like to know if the body scan was received by Dr. Wadsworth. Please call back to provide update.

## 2025-02-20 DIAGNOSIS — E53.8 B12 DEFICIENCY: ICD-10-CM

## 2025-02-20 DIAGNOSIS — Z00.00 ANNUAL PHYSICAL EXAM: ICD-10-CM

## 2025-02-20 DIAGNOSIS — R35.0 URINARY FREQUENCY: ICD-10-CM

## 2025-02-20 DIAGNOSIS — I25.10 CORONARY ARTERY DISEASE INVOLVING NATIVE CORONARY ARTERY OF NATIVE HEART WITHOUT ANGINA PECTORIS: ICD-10-CM

## 2025-02-20 DIAGNOSIS — R35.0 URINARY FREQUENCY: Primary | ICD-10-CM

## 2025-02-20 DIAGNOSIS — E78.2 MIXED HYPERLIPIDEMIA: Primary | ICD-10-CM

## 2025-02-20 DIAGNOSIS — R73.01 IMPAIRED FASTING BLOOD SUGAR: ICD-10-CM

## 2025-02-20 DIAGNOSIS — M25.59 PAIN IN OTHER JOINT: ICD-10-CM

## 2025-02-20 RX ORDER — ASPIRIN 81 MG/1
81 TABLET ORAL DAILY
Qty: 90 TABLET | Refills: 3 | Status: SHIPPED | OUTPATIENT
Start: 2025-02-20

## 2025-02-20 RX ORDER — ROSUVASTATIN CALCIUM 20 MG/1
20 TABLET, COATED ORAL DAILY
Qty: 100 TABLET | Refills: 1 | Status: SHIPPED | OUTPATIENT
Start: 2025-02-20

## 2025-03-18 DIAGNOSIS — B49 FUNGAL INFECTION: ICD-10-CM

## 2025-03-18 RX ORDER — CLOTRIMAZOLE AND BETAMETHASONE DIPROPIONATE 10; .64 MG/G; MG/G
CREAM TOPICAL 2 TIMES DAILY
Qty: 30 G | Refills: 1 | Status: SHIPPED | OUTPATIENT
Start: 2025-03-18

## 2025-03-18 NOTE — TELEPHONE ENCOUNTER
Medication:  clotrimazole-betamethasone (LOTRISONE) 1-0.05 % cream    Dose/Frequency: Apply topically 2 (two) times a day,     Quantity: 30 g    Pharmacy: [id number] - [place] - [id number]        Office:   [x] PCP/Provider -   [] Speciality/Provider -     Does the patient have enough for 3 days?   [] Yes   [x] No - Send as HP to POD

## 2025-03-27 ENCOUNTER — RESULTS FOLLOW-UP (OUTPATIENT)
Dept: INTERNAL MEDICINE CLINIC | Facility: CLINIC | Age: 65
End: 2025-03-27

## 2025-03-27 LAB
ALBUMIN SERPL-MCNC: 4.8 G/DL (ref 3.6–5.1)
ALBUMIN/GLOB SERPL: 2.4 (CALC) (ref 1–2.5)
ALP SERPL-CCNC: 59 U/L (ref 35–144)
ALT SERPL-CCNC: 28 U/L (ref 9–46)
APPEARANCE UR: CLEAR
AST SERPL-CCNC: 17 U/L (ref 10–35)
BACTERIA UR QL AUTO: NORMAL /HPF
BASOPHILS # BLD AUTO: 67 CELLS/UL (ref 0–200)
BASOPHILS NFR BLD AUTO: 0.7 %
BILIRUB SERPL-MCNC: 0.6 MG/DL (ref 0.2–1.2)
BILIRUB UR QL STRIP: NEGATIVE
BUN SERPL-MCNC: 18 MG/DL (ref 7–25)
BUN/CREAT SERPL: ABNORMAL (CALC) (ref 6–22)
CALCIUM SERPL-MCNC: 9.5 MG/DL (ref 8.6–10.3)
CHLORIDE SERPL-SCNC: 104 MMOL/L (ref 98–110)
CHOLEST SERPL-MCNC: 144 MG/DL
CHOLEST/HDLC SERPL: 2.9 (CALC)
CO2 SERPL-SCNC: 30 MMOL/L (ref 20–32)
COLOR UR: YELLOW
CREAT SERPL-MCNC: 1.1 MG/DL (ref 0.7–1.35)
EOSINOPHIL # BLD AUTO: 257 CELLS/UL (ref 15–500)
EOSINOPHIL NFR BLD AUTO: 2.7 %
ERYTHROCYTE [DISTWIDTH] IN BLOOD BY AUTOMATED COUNT: 12.3 % (ref 11–15)
GFR/BSA.PRED SERPLBLD CYS-BASED-ARV: 75 ML/MIN/1.73M2
GLOBULIN SER CALC-MCNC: 2 G/DL (CALC) (ref 1.9–3.7)
GLUCOSE SERPL-MCNC: 112 MG/DL (ref 65–99)
GLUCOSE UR QL STRIP: NEGATIVE
HBA1C MFR BLD: 5.7 % OF TOTAL HGB
HCT VFR BLD AUTO: 45.7 % (ref 38.5–50)
HDLC SERPL-MCNC: 49 MG/DL
HGB BLD-MCNC: 15.4 G/DL (ref 13.2–17.1)
HGB UR QL STRIP: NEGATIVE
HYALINE CASTS #/AREA URNS LPF: NORMAL /LPF
KETONES UR QL STRIP: NEGATIVE
LDLC SERPL CALC-MCNC: 70 MG/DL (CALC)
LEUKOCYTE ESTERASE UR QL STRIP: NEGATIVE
LYMPHOCYTES # BLD AUTO: 3297 CELLS/UL (ref 850–3900)
LYMPHOCYTES NFR BLD AUTO: 34.7 %
MCH RBC QN AUTO: 32.2 PG (ref 27–33)
MCHC RBC AUTO-ENTMCNC: 33.7 G/DL (ref 32–36)
MCV RBC AUTO: 95.6 FL (ref 80–100)
MONOCYTES # BLD AUTO: 608 CELLS/UL (ref 200–950)
MONOCYTES NFR BLD AUTO: 6.4 %
NEUTROPHILS # BLD AUTO: 5273 CELLS/UL (ref 1500–7800)
NEUTROPHILS NFR BLD AUTO: 55.5 %
NITRITE UR QL STRIP: NEGATIVE
NONHDLC SERPL-MCNC: 95 MG/DL (CALC)
PH UR STRIP: 5.5 [PH] (ref 5–8)
PLATELET # BLD AUTO: 230 THOUSAND/UL (ref 140–400)
PMV BLD REES-ECKER: 10.3 FL (ref 7.5–12.5)
POTASSIUM SERPL-SCNC: 4.6 MMOL/L (ref 3.5–5.3)
PROT SERPL-MCNC: 6.8 G/DL (ref 6.1–8.1)
PROT UR QL STRIP: NEGATIVE
PSA FREE MFR SERPL: 32 % (CALC)
PSA FREE SERPL-MCNC: 0.6 NG/ML
PSA SERPL-MCNC: 1.9 NG/ML
RBC # BLD AUTO: 4.78 MILLION/UL (ref 4.2–5.8)
RBC #/AREA URNS HPF: NORMAL /HPF
SODIUM SERPL-SCNC: 139 MMOL/L (ref 135–146)
SP GR UR STRIP: 1.02 (ref 1–1.03)
SQUAMOUS #/AREA URNS HPF: NORMAL /HPF
TRIGL SERPL-MCNC: 167 MG/DL
TSH SERPL-ACNC: 1.2 MIU/L (ref 0.4–4.5)
URATE SERPL-MCNC: 4.9 MG/DL (ref 4–8)
VIT B12 SERPL-MCNC: 332 PG/ML (ref 200–1100)
WBC # BLD AUTO: 9.5 THOUSAND/UL (ref 3.8–10.8)
WBC #/AREA URNS HPF: NORMAL /HPF

## 2025-04-02 NOTE — TELEPHONE ENCOUNTER
Pt returned call, he made appt for 4/23, but would like to speak to someone about the results. Thank you

## 2025-04-23 ENCOUNTER — OFFICE VISIT (OUTPATIENT)
Dept: INTERNAL MEDICINE CLINIC | Facility: CLINIC | Age: 65
End: 2025-04-23
Payer: COMMERCIAL

## 2025-04-23 VITALS
BODY MASS INDEX: 30.81 KG/M2 | WEIGHT: 215.2 LBS | TEMPERATURE: 98.3 F | HEART RATE: 68 BPM | HEIGHT: 70 IN | DIASTOLIC BLOOD PRESSURE: 84 MMHG | OXYGEN SATURATION: 96 % | SYSTOLIC BLOOD PRESSURE: 138 MMHG

## 2025-04-23 DIAGNOSIS — L40.9 PSORIASIS: ICD-10-CM

## 2025-04-23 DIAGNOSIS — R73.01 IMPAIRED FASTING BLOOD SUGAR: ICD-10-CM

## 2025-04-23 DIAGNOSIS — E78.2 MIXED HYPERLIPIDEMIA: Primary | ICD-10-CM

## 2025-04-23 DIAGNOSIS — Z00.00 ANNUAL PHYSICAL EXAM: ICD-10-CM

## 2025-04-23 DIAGNOSIS — Z12.11 ENCOUNTER FOR SCREENING FOR MALIGNANT NEOPLASM OF COLON: ICD-10-CM

## 2025-04-23 PROCEDURE — 99396 PREV VISIT EST AGE 40-64: CPT | Performed by: INTERNAL MEDICINE

## 2025-04-23 PROCEDURE — 99214 OFFICE O/P EST MOD 30 MIN: CPT | Performed by: INTERNAL MEDICINE

## 2025-04-23 RX ORDER — TRIAMCINOLONE ACETONIDE 1 MG/G
1 CREAM TOPICAL DAILY
COMMUNITY
Start: 2025-04-08

## 2025-04-23 RX ORDER — AMMONIUM LACTATE 12 G/100G
1 LOTION TOPICAL DAILY
COMMUNITY
Start: 2025-04-07

## 2025-04-23 RX ORDER — PREDNISONE 5 MG/1
5 TABLET ORAL 2 TIMES DAILY WITH MEALS
COMMUNITY
Start: 2025-04-21

## 2025-04-23 RX ORDER — CICLOPIROX 80 MG/ML
1 SOLUTION TOPICAL
COMMUNITY
Start: 2025-04-22

## 2025-04-23 RX ORDER — KETOCONAZOLE 20 MG/ML
1 SHAMPOO, SUSPENSION TOPICAL DAILY
COMMUNITY
Start: 2025-04-23

## 2025-04-23 NOTE — PATIENT INSTRUCTIONS
"Patient Education     Routine physical for adults   The Basics   Written by the doctors and editors at [facility]   What is a physical? -- A physical is a routine visit, or \"check-up,\" with your doctor. You might also hear it called a \"wellness visit\" or \"preventive visit.\"  During each visit, the doctor will:   Ask about your physical and mental health   Ask about your habits, behaviors, and lifestyle   Do an exam   Give you vaccines if needed   Talk to you about any medicines you take   Give advice about your health   Answer your questions  Getting regular check-ups is an important part of taking care of your health. It can help your doctor find and treat any problems you have. But it's also important for preventing health problems.  A routine physical is different from a \"sick visit.\" A sick visit is when you see a doctor because of a health concern or problem. Since physicals are scheduled ahead of time, you can think about what you want to ask the doctor.  How often should I get a physical? -- It depends on your age and health. In general, for people age 21 years and older:   If you are younger than 50 years, you might be able to get a physical every 3 years.   If you are 50 years or older, your doctor might recommend a physical every year.  If you have an ongoing health condition, like diabetes or high blood pressure, your doctor will probably want to see you more often.  What happens during a physical? -- In general, each visit will include:   Physical exam - The doctor or nurse will check your height, weight, heart rate, and blood pressure. They will also look at your eyes and ears. They will ask about how you are feeling and whether you have any symptoms that bother you.   Medicines - It's a good idea to bring a list of all the medicines you take to each doctor visit. Your doctor will talk to you about your medicines and answer any questions. Tell them if you are having any side effects that bother you. You " "should also tell them if you are having trouble paying for any of your medicines.   Habits and behaviors - This includes:   Your diet   Your exercise habits   Whether you smoke, drink alcohol, or use drugs   Whether you are sexually active   Whether you feel safe at home  Your doctor will talk to you about things you can do to improve your health and lower your risk of health problems. They will also offer help and support. For example, if you want to quit smoking, they can give you advice and might prescribe medicines. If you want to improve your diet or get more physical activity, they can help you with this, too.   Lab tests, if needed - The tests you get will depend on your age and situation. For example, your doctor might want to check your:   Cholesterol   Blood sugar   Iron level   Vaccines - The recommended vaccines will depend on your age, health, and what vaccines you already had. Vaccines are very important because they can prevent certain serious or deadly infections.   Discussion of screening - \"Screening\" means checking for diseases or other health problems before they cause symptoms. Your doctor can recommend screening based on your age, risk, and preferences. This might include tests to check for:   Cancer, such as breast, prostate, cervical, ovarian, colorectal, prostate, lung, or skin cancer   Sexually transmitted infections, such as chlamydia and gonorrhea   Mental health conditions like depression and anxiety  Your doctor will talk to you about the different types of screening tests. They can help you decide which screenings to have. They can also explain what the results might mean.   Answering questions - The physical is a good time to ask the doctor or nurse questions about your health. If needed, they can refer you to other doctors or specialists, too.  Adults older than 65 years often need other care, too. As you get older, your doctor will talk to you about:   How to prevent falling at " home   Hearing or vision tests   Memory testing   How to take your medicines safely   Making sure that you have the help and support you need at home  All topics are updated as new evidence becomes available and our peer review process is complete.  This topic retrieved from [employer] on: May 02, 2024.  Topic 438511 Version 1.0  Release: 32.4.3 - C32.122  © 2024 UpToDate, Inc. and/or its affiliates. All rights reserved.  Consumer Information Use and Disclaimer   Disclaimer: This generalized information is a limited summary of diagnosis, treatment, and/or medication information. It is not meant to be comprehensive and should be used as a tool to help the user understand and/or assess potential diagnostic and treatment options. It does NOT include all information about conditions, treatments, medications, side effects, or risks that may apply to a specific patient. It is not intended to be medical advice or a substitute for the medical advice, diagnosis, or treatment of a health care provider based on the health care provider's examination and assessment of a patient's specific and unique circumstances. Patients must speak with a health care provider for complete information about their health, medical questions, and treatment options, including any risks or benefits regarding use of medications. This information does not endorse any treatments or medications as safe, effective, or approved for treating a specific patient. UpToDate, Inc. and its affiliates disclaim any warranty or liability relating to this information or the use thereof.The use of this information is governed by the Terms of Use, available at https://www.woltersLabPixiesuwer.com/en/know/clinical-effectiveness-terms. 2024© UpToDate, Inc. and its affiliates and/or licensors. All rights reserved.  Copyright   © 2024 UpToDate, Inc. and/or its affiliates. All rights reserved.

## 2025-04-23 NOTE — PROGRESS NOTES
Adult Annual Physical  Name: Issac Oliver      : 1960      MRN: [id number]  Encounter Provider: Emory Wadsworth MD  Encounter Date: 2025   Encounter department: [facility] INTERNAL MEDICINE    :  Assessment & Plan  Mixed hyperlipidemia         Impaired fasting blood sugar         Psoriasis         Annual physical exam         Encounter for screening for malignant neoplasm of colon    Orders:  •  Ambulatory Referral to Gastroenterology; Future    Mixed hyperlipidemia         Impaired fasting blood sugar         Psoriasis         Annual physical exam               Preventive Screenings:    - Prostate cancer screening: screening up-to-date     Immunizations:  - Immunizations due: Influenza, Prevnar 20, Tdap and Zoster (Shingrix)         History of Present Illness     Adult Annual Physical:  Patient presents for annual physical. physical.     Diet and Physical Activity:  - Diet/Nutrition: low fat diet.  - Exercise: moderate cardiovascular exercise.    Depression Screening:  - PHQ-2 Score: 0    General Health:  - Sleep: sleeps well.  - Hearing: normal hearing bilateral ears.  - Vision: no vision problems.  - Dental: regular dental visits.     Health:  - History of STDs: no.   - Urinary symptoms: none.     Advanced Care Planning:  - Has an advanced directive?: no    - Has a durable medical POA?: yes    - ACP document given to patient?: yes      Review of Systems   Constitutional:  Negative for chills and fever.   HENT:  Negative for congestion, ear pain and sore throat.    Eyes:  Negative for pain.   Respiratory:  Negative for cough and shortness of breath.    Cardiovascular:  Negative for chest pain and leg swelling.   Gastrointestinal:  Negative for abdominal pain, nausea and vomiting.   Endocrine: Negative for polyuria.   Genitourinary:  Negative for difficulty urinating, frequency and urgency.   Musculoskeletal:  Negative for arthralgias and back pain.   Skin:  Negative for rash.  "  Neurological:  Negative for weakness and headaches.   Psychiatric/Behavioral:  Negative for sleep disturbance. The patient is not nervous/anxious.      Current Outpatient Medications on File Prior to Visit   Medication Sig Dispense Refill   • ammonium lactate (LAC-HYDRIN) 12 % lotion Apply 1 Application topically daily     • aspirin (Aspirin 81) 81 mg EC tablet Take 1 tablet (81 mg total) by mouth daily 90 tablet 3   • ciclopirox (PENLAC) 8 % solution Apply 1 Application topically daily at bedtime     • clotrimazole-betamethasone (LOTRISONE) 1-0.05 % cream Apply topically 2 (two) times a day 30 g 1   • ketoconazole (NIZORAL) 2 % shampoo Apply 1 Application topically in the morning     • predniSONE 5 mg tablet Take 5 mg by mouth 2 (two) times a day with meals     • rosuvastatin (CRESTOR) 20 MG tablet Take 1 tablet (20 mg total) by mouth daily 100 tablet 1   • triamcinolone (KENALOG) 0.1 % cream Apply 1 Application topically in the morning       No current facility-administered medications on file prior to visit.      Social History     Tobacco Use   • Smoking status: Every Day     Current packs/day: 1.00     Average packs/day: 1 pack/day for 45.0 years (45.0 ttl pk-yrs)     Types: Cigarettes     Passive exposure: Never   • Smokeless tobacco: Never   • Tobacco comments:     smokes 11-20 cigarettes/day - As per [employer]    Vaping Use   • Vaping status: Never Used   Substance and Sexual Activity   • Alcohol use: Yes     Comment: Socially    • Drug use: Not on file   • Sexual activity: Not on file       Objective   /84 (BP Location: Left arm, Patient Position: Sitting, Cuff Size: Standard)   Pulse 68   Temp 98.3 °F (36.8 °C) (Temporal)   Ht 5' 10\" (1.778 m)   Wt 97.6 kg (215 lb 3.2 oz)   SpO2 96%   BMI 30.88 kg/m²     Physical Exam  Vitals and nursing note reviewed.   Constitutional:       General: He is not in acute distress.     Appearance: He is well-developed.   HENT:      Head: Normocephalic and " atraumatic.   Eyes:      Conjunctiva/sclera: Conjunctivae normal.   Cardiovascular:      Rate and Rhythm: Normal rate and regular rhythm.      Heart sounds: No murmur heard.  Pulmonary:      Effort: Pulmonary effort is normal. No respiratory distress.      Breath sounds: Normal breath sounds.   Abdominal:      Palpations: Abdomen is soft.      Tenderness: There is no abdominal tenderness.   Musculoskeletal:         General: No swelling.      Cervical back: Neck supple.   Skin:     General: Skin is warm and dry.      Capillary Refill: Capillary refill takes less than 2 seconds.   Neurological:      Mental Status: He is alert.   Psychiatric:         Mood and Affect: Mood normal.

## 2025-04-23 NOTE — PROGRESS NOTES
Assessment/Plan:             1. Mixed hyperlipidemia  Comments:  continue same med  Assessment & Plan:         2. Impaired fasting blood sugar  Comments:  diet and exercise discussed  Assessment & Plan:         3. Psoriasis  Assessment & Plan:         4. Annual physical exam  Assessment & Plan:         5. Encounter for screening for malignant neoplasm of colon  -     Ambulatory Referral to Gastroenterology; Future         Subjective:      Patient ID: Issac Oliver is a 64 y.o. male.    Follow-up on blood test on 3/26/2025 test discussed with him, also physical        The following portions of the patient's history were reviewed and updated as appropriate: He  has a past medical history of Cervical disc disorder with radiculopathy, unspecified cervical region, Dyslipidemia, and Vitamin D deficiency.  He   Patient Active Problem List    Diagnosis Date Noted   • Psoriasis 04/23/2025   • Annual physical exam 01/03/2023   • Other male erectile dysfunction 01/03/2023   • Urinary frequency 01/03/2023   • Mixed hyperlipidemia 01/03/2023   • Impaired fasting blood sugar 01/03/2023     He  has a past surgical history that includes No past surgeries.  His family history includes Coronary artery disease in his mother; Leukemia in his father.  He  reports that he has been smoking cigarettes. He has a 45 pack-year smoking history. He has never been exposed to tobacco smoke. He has never used smokeless tobacco. He reports current alcohol use. No history on file for drug use.  Current Outpatient Medications   Medication Sig Dispense Refill   • ammonium lactate (LAC-HYDRIN) 12 % lotion Apply 1 Application topically daily     • aspirin (Aspirin 81) 81 mg EC tablet Take 1 tablet (81 mg total) by mouth daily 90 tablet 3   • ciclopirox (PENLAC) 8 % solution Apply 1 Application topically daily at bedtime     • clotrimazole-betamethasone (LOTRISONE) 1-0.05 % cream Apply topically 2 (two) times a day 30 g 1   • ketoconazole (NIZORAL) 2 %  shampoo Apply 1 Application topically in the morning     • predniSONE 5 mg tablet Take 5 mg by mouth 2 (two) times a day with meals     • rosuvastatin (CRESTOR) 20 MG tablet Take 1 tablet (20 mg total) by mouth daily 100 tablet 1   • triamcinolone (KENALOG) 0.1 % cream Apply 1 Application topically in the morning       No current facility-administered medications for this visit.     Current Outpatient Medications on File Prior to Visit   Medication Sig   • ammonium lactate (LAC-HYDRIN) 12 % lotion Apply 1 Application topically daily   • aspirin (Aspirin 81) 81 mg EC tablet Take 1 tablet (81 mg total) by mouth daily   • ciclopirox (PENLAC) 8 % solution Apply 1 Application topically daily at bedtime   • clotrimazole-betamethasone (LOTRISONE) 1-0.05 % cream Apply topically 2 (two) times a day   • ketoconazole (NIZORAL) 2 % shampoo Apply 1 Application topically in the morning   • predniSONE 5 mg tablet Take 5 mg by mouth 2 (two) times a day with meals   • rosuvastatin (CRESTOR) 20 MG tablet Take 1 tablet (20 mg total) by mouth daily   • triamcinolone (KENALOG) 0.1 % cream Apply 1 Application topically in the morning     No current facility-administered medications on file prior to visit.     He has no known allergies..    Review of Systems   Constitutional:  Negative for chills and fever.   HENT:  Negative for congestion, ear pain and sore throat.    Eyes:  Negative for pain.   Respiratory:  Negative for cough and shortness of breath.    Cardiovascular:  Negative for chest pain and leg swelling.   Gastrointestinal:  Negative for abdominal pain, nausea and vomiting.   Endocrine: Negative for polyuria.   Genitourinary:  Negative for difficulty urinating, frequency and urgency.   Musculoskeletal:  Negative for arthralgias and back pain.   Skin:  Negative for rash.   Neurological:  Negative for weakness and headaches.   Psychiatric/Behavioral:  Negative for sleep disturbance. The patient is not nervous/anxious.       "    Objective:      /84 (BP Location: Left arm, Patient Position: Sitting, Cuff Size: Standard)   Pulse 68   Temp 98.3 °F (36.8 °C) (Temporal)   Ht 5' 10\" (1.778 m)   Wt 97.6 kg (215 lb 3.2 oz)   SpO2 96%   BMI 30.88 kg/m²     Recent Results (from the past 8 weeks)   Lipid Panel with Direct LDL reflex    Collection Time: 03/26/25  9:07 AM   Result Value Ref Range    Total Cholesterol 144 <200 mg/dL    HDL 49 > OR = 40 mg/dL    Triglycerides 167 (H) <150 mg/dL    LDL Calculated 70 mg/dL (calc)    Chol HDLC Ratio 2.9 <5.0 (calc)    Non-HDL Cholesterol 95 <130 mg/dL (calc)   Uric acid    Collection Time: 03/26/25  9:07 AM   Result Value Ref Range    Uric Acid 4.9 4.0 - 8.0 mg/dL   Comprehensive metabolic panel    Collection Time: 03/26/25  9:07 AM   Result Value Ref Range    Glucose, Random 112 (H) 65 - 99 mg/dL    BUN 18 7 - 25 mg/dL    Creatinine 1.10 0.70 - 1.35 mg/dL    eGFR 75 > OR = 60 mL/min/1.73m2    SL AMB BUN/CREATININE RATIO SEE NOTE: 6 - 22 (calc)    Sodium 139 135 - 146 mmol/L    Potassium 4.6 3.5 - 5.3 mmol/L    Chloride 104 98 - 110 mmol/L    CO2 30 20 - 32 mmol/L    Calcium 9.5 8.6 - 10.3 mg/dL    Protein, Total 6.8 6.1 - 8.1 g/dL    Albumin 4.8 3.6 - 5.1 g/dL    Globulin 2.0 1.9 - 3.7 g/dL (calc)    Albumin/Globulin Ratio 2.4 1.0 - 2.5 (calc)    TOTAL BILIRUBIN 0.6 0.2 - 1.2 mg/dL    Alkaline Phosphatase 59 35 - 144 U/L    AST 17 10 - 35 U/L    ALT 28 9 - 46 U/L   Urinalysis with microscopic    Collection Time: 03/26/25  9:07 AM   Result Value Ref Range    Color UA YELLOW YELLOW    Urine Appearance CLEAR CLEAR    Specific Gravity 1.020 1.001 - 1.035    Ph 5.5 5.0 - 8.0    Glucose, Urine NEGATIVE NEGATIVE    Bilirubin, Urine NEGATIVE NEGATIVE    Ketone, Urine NEGATIVE NEGATIVE    Blood, Urine NEGATIVE NEGATIVE    Protein, Urine NEGATIVE NEGATIVE    Nitrites Urine NEGATIVE NEGATIVE    Leukocyte Esterase NEGATIVE NEGATIVE    SL AMB WBC, URINE NONE SEEN < OR = 5 /HPF    RBC, Urine NONE SEEN < " OR = 2 /HPF    Squamous Epithelial Cells NONE SEEN < OR = 5 /HPF    Bacteria, UA NONE SEEN NONE SEEN /HPF    Hyaline Casts NONE SEEN NONE SEEN /LPF    Comment(s)     CBC and differential    Collection Time: 03/26/25  9:07 AM   Result Value Ref Range    White Blood Cell Count 9.5 3.8 - 10.8 Thousand/uL    Red Blood Cell Count 4.78 4.20 - 5.80 Million/uL    Hemoglobin 15.4 13.2 - 17.1 g/dL    HCT 45.7 38.5 - 50.0 %    MCV 95.6 80.0 - 100.0 fL    MCH 32.2 27.0 - 33.0 pg    MCHC 33.7 32.0 - 36.0 g/dL    RDW 12.3 11.0 - 15.0 %    Platelet Count 230 140 - 400 Thousand/uL    SL AMB MPV 10.3 7.5 - 12.5 fL    Neutrophils (Absolute) 5,273 1,500 - 7,800 cells/uL    Lymphocytes (Absolute) 3,297 850 - 3,900 cells/uL    Monocytes (Absolute) 608 200 - 950 cells/uL    Eosinophils (Absolute) 257 15 - 500 cells/uL    Basophils ABS 67 0 - 200 cells/uL    Neutrophils 55.5 %    Lymphocytes 34.7 %    Monocytes 6.4 %    Eosinophils 2.7 %    Basophils PCT 0.7 %   TSH, 3rd generation    Collection Time: 03/26/25  9:07 AM   Result Value Ref Range    TSH 1.20 0.40 - 4.50 mIU/L   Vitamin B12    Collection Time: 03/26/25  9:07 AM   Result Value Ref Range    Vitamin B-12 332 200 - 1,100 pg/mL   PSA, total and free    Collection Time: 03/26/25  9:07 AM   Result Value Ref Range    Prostate Specific Antigen Total 1.9 < OR = 4.0 ng/mL    PSA, Free 0.6 ng/mL    PSA, Free Pct 32 >25 % (calc)   Hemoglobin A1c (w/out EAG)    Collection Time: 03/26/25  9:07 AM   Result Value Ref Range    Hemoglobin A1C 5.7 (H) <5.7 % of total Hgb        Physical Exam  Constitutional:       Appearance: Normal appearance.   HENT:      Head: Normocephalic.      Right Ear: Tympanic membrane, ear canal and external ear normal.      Left Ear: Tympanic membrane, ear canal and external ear normal.      Nose: Nose normal. No congestion.      Mouth/Throat:      Mouth: Mucous membranes are moist.      Pharynx: Oropharynx is clear. No oropharyngeal exudate or posterior oropharyngeal  erythema.   Eyes:      Extraocular Movements: Extraocular movements intact.      Conjunctiva/sclera: Conjunctivae normal.   Cardiovascular:      Rate and Rhythm: Normal rate and regular rhythm.      Heart sounds: Normal heart sounds. No murmur heard.  Pulmonary:      Effort: Pulmonary effort is normal.      Breath sounds: Normal breath sounds. No wheezing or rales.   Abdominal:      General: Abdomen is flat. There is no distension.      Palpations: Abdomen is soft.      Tenderness: There is no abdominal tenderness.   Musculoskeletal:         General: Normal range of motion.      Cervical back: Normal range of motion and neck supple.      Right lower leg: No edema.      Left lower leg: No edema.   Lymphadenopathy:      Cervical: No cervical adenopathy.   Skin:     General: Skin is warm.   Neurological:      General: No focal deficit present.      Mental Status: He is alert and oriented to person, place, and time.

## 2025-08-17 DIAGNOSIS — E78.2 MIXED HYPERLIPIDEMIA: ICD-10-CM

## 2025-08-19 RX ORDER — ROSUVASTATIN CALCIUM 20 MG/1
20 TABLET, COATED ORAL DAILY
Qty: 90 TABLET | Refills: 2 | Status: SHIPPED | OUTPATIENT
Start: 2025-08-19